# Patient Record
Sex: MALE
[De-identification: names, ages, dates, MRNs, and addresses within clinical notes are randomized per-mention and may not be internally consistent; named-entity substitution may affect disease eponyms.]

---

## 2020-04-25 ENCOUNTER — MESSAGE (OUTPATIENT)
Age: 40
End: 2020-04-25

## 2021-05-21 ENCOUNTER — EMERGENCY (EMERGENCY)
Facility: HOSPITAL | Age: 41
LOS: 1 days | Discharge: HOME | End: 2021-05-21
Attending: EMERGENCY MEDICINE
Payer: COMMERCIAL

## 2021-05-21 VITALS
SYSTOLIC BLOOD PRESSURE: 156 MMHG | HEIGHT: 71 IN | RESPIRATION RATE: 16 BRPM | HEART RATE: 96 BPM | DIASTOLIC BLOOD PRESSURE: 96 MMHG | OXYGEN SATURATION: 99 % | TEMPERATURE: 98 F | WEIGHT: 199.96 LBS

## 2021-05-21 PROCEDURE — 99283 EMERGENCY DEPT VISIT LOW MDM: CPT

## 2021-05-21 PROCEDURE — 99053 MED SERV 10PM-8AM 24 HR FAC: CPT

## 2021-05-21 NOTE — ED PROVIDER NOTE - CLINICAL SUMMARY MEDICAL DECISION MAKING FREE TEXT BOX
42 yo M PMH T1DM presents after blood exposure to the eyes at work while intubating. washed out eyes extensively. appropriate blood work sent. patient will f/u with employee health and receive post exposure prophylaxis

## 2021-05-21 NOTE — ED PROVIDER NOTE - ATTENDING CONTRIBUTION TO CARE
blood exposure to mucosa. pt had thorough rinsing in ED. no other acute issues. post exposure ppx given. pt stable to f/u with employee health.

## 2021-05-21 NOTE — ED PROVIDER NOTE - PROGRESS NOTE DETAILS
Dipti Centeno MD PGY-3 discussed risks and benefits of post exposure prophylaxis. patient decided to accept it. 7 day course provided in ER, informed to f/u with employee health in 2 days.

## 2021-05-21 NOTE — ED PROVIDER NOTE - NS ED ROS FT
REVIEW OF SYSTEMS:  General:  no fever, no chills  HEENT: +body fluid exposure in eyes per HPI  Cardiac: no chest pain  Respiratory: no cough, no shortness of breath  Gastrointestinal: no abdominal pain  -Dipti Centeno PGY-3

## 2021-05-21 NOTE — ED PROVIDER NOTE - PATIENT PORTAL LINK FT
You can access the FollowMyHealth Patient Portal offered by [facility] by registering at the following website: [email or web address]. By joining [facility]’s FollowMyHealth portal, you will also be able to view your health information using other applications (apps) compatible with our system.

## 2021-05-21 NOTE — ED ADULT NURSE NOTE - OBJECTIVE STATEMENT
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 The pt they were taken care of was brought to [facility]. Pt denies changes in vision, rash or other symptoms. Pt butterflied for labwork. 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 The pt they were taken care of was brought to [facility]. Pt denies changes in vision, rash or other symptoms. Pt butterflied for labwork and used eyewash station

## 2021-05-21 NOTE — ED PROVIDER NOTE - PHYSICAL EXAMINATION
PHYSICAL EXAM:   General: well-appearing, appears stated age, not in extremis   HEENT: NC/AT, airway patent  Cardiovascular: regular rate   Respiratory:  nonlabored respirations  Neuro: awake, alert, interactive. Moving all extremities. Ambulatory  Psychiatric: appropriate mood and affect.   -Dipti Centeno PGY-3

## 2021-05-21 NOTE — ED ADULT NURSE NOTE - NSIMPLEMENTINTERV_GEN_ALL_ED
Implemented All Universal Safety Interventions:  [place] to call system. Call bell, personal items and telephone within reach. Instruct patient to call for assistance. Room bathroom lighting operational. Non-slip footwear when patient is off stretcher. Physically safe environment: no spills, clutter or unnecessary equipment. Stretcher in lowest position, wheels locked, appropriate side rails in place.

## 2021-05-21 NOTE — ED PROVIDER NOTE - OBJECTIVE STATEMENT
40 yo M PMH T1DM presents after blood exposure to the eyes at work while intubating. 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 No other complaints at this time. Source patient was an IV drug user, but unknown hx hepatitis/HIV.

## 2021-05-21 NOTE — ED PROVIDER NOTE - NSFOLLOWUPINSTRUCTIONS_ED_ALL_ED_FT
You were exposed to body fluids     Please take the post exposure prophylaxis as prescribed.     Please follow up with employee health within 3 days     Please return to the ER for any new or worsening symptoms or medication side effects

## 2024-01-26 ENCOUNTER — NON-APPOINTMENT (OUTPATIENT)
Age: 44
End: 2024-01-26

## 2024-02-01 ENCOUNTER — EMERGENCY (EMERGENCY)
Facility: HOSPITAL | Age: 44
LOS: 1 days | Discharge: HOME | End: 2024-02-01
Attending: EMERGENCY MEDICINE | Admitting: EMERGENCY MEDICINE
Payer: OTHER MISCELLANEOUS

## 2024-02-01 VITALS
HEIGHT: 72 IN | DIASTOLIC BLOOD PRESSURE: 100 MMHG | HEART RATE: 75 BPM | TEMPERATURE: 99 F | SYSTOLIC BLOOD PRESSURE: 148 MMHG | RESPIRATION RATE: 16 BRPM | WEIGHT: 205.03 LBS | OXYGEN SATURATION: 97 %

## 2024-02-01 DIAGNOSIS — M25.511 PAIN IN RIGHT SHOULDER: ICD-10-CM
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
## 2024-02-01 DIAGNOSIS — S49.91XA UNSPECIFIED INJURY OF RIGHT SHOULDER AND UPPER ARM, INITIAL ENCOUNTER: ICD-10-CM
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
## 2024-02-01 DIAGNOSIS — Z79.4 LONG TERM (CURRENT) USE OF INSULIN: ICD-10-CM

## 2024-02-01 DIAGNOSIS — E11.9 TYPE 2 DIABETES MELLITUS WITHOUT COMPLICATIONS: ICD-10-CM

## 2024-02-01 PROCEDURE — 99284 EMERGENCY DEPT VISIT MOD MDM: CPT

## 2024-02-01 PROCEDURE — 73030 X-RAY EXAM OF SHOULDER: CPT | Mod: 26,RT

## 2024-02-01 RX ORDER — IBUPROFEN 200 MG
600 TABLET ORAL ONCE
Refills: 0 | Status: COMPLETED | OUTPATIENT
Start: 2024-02-01 | End: 2024-02-01

## 2024-02-01 RX ORDER — ACETAMINOPHEN 500 MG
650 TABLET ORAL ONCE
Refills: 0 | Status: COMPLETED | OUTPATIENT
Start: 2024-02-01 | End: 2024-02-01

## 2024-02-01 RX ADMIN — Medication 600 MILLIGRAM(S): at 17:54

## 2024-02-01 RX ADMIN — Medication 650 MILLIGRAM(S): at 17:54

## 2024-02-01 NOTE — ED PROVIDER NOTE - UPPER EXTREMITY EXAM, RIGHT
Pain with abduction of right shoulder there is no deformities distal median radial ulnar nerves are intact motor and sensory.

## 2024-02-01 NOTE — ED ADULT NURSE NOTE - OBJECTIVE STATEMENT
Patient is a 43 y/o M c/o right shoulder pain. 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 patient reports difficulty lifting shoulder. Patient reports hx of DM.

## 2024-02-01 NOTE — ED PROVIDER NOTE - NSFOLLOWUPINSTRUCTIONS_ED_ALL_ED_FT
Rotator Cuff Injury    WHAT YOU NEED TO KNOW:    A rotator cuff injury is damage to the muscles or tendons of your rotator cuff. The rotator cuff is a group of muscles and tendons that hold the shoulder joint in place. The damage may include muscle stretching, tendon tears, or bursa inflammation. The bursa is a fluid sac around the joint.  Shoulder Anatomy    DISCHARGE INSTRUCTIONS:    Call your doctor or orthopedist if:    You suddenly cannot move your arm.    The pain in your shoulder or arm is not improving, or is worse than before you started treatment.    You have new pain in your neck.    You have questions or concerns about your condition or care.  Medicines: You may need any of the following:    Acetaminophen decreases pain and fever. It is available without a doctor's order. Ask how much to take and how often to take it. Follow directions. Read the labels of all other medicines you are using to see if they also contain acetaminophen, or ask your doctor or pharmacist. Acetaminophen can cause liver damage if not taken correctly.    NSAIDs help decrease swelling and pain or fever. This medicine is available with or without a doctor's order. NSAIDs can cause stomach bleeding or kidney problems in certain people. If you take blood thinner medicine, always ask your healthcare provider if NSAIDs are safe for you. Always read the medicine label and follow directions.    Prescription pain medicine may be given. Ask your healthcare provider how to take this medicine safely. Some prescription pain medicines contain acetaminophen. Do not take other medicines that contain acetaminophen without talking to your healthcare provider. Too much acetaminophen may cause liver damage. Prescription pain medicine may cause constipation. Ask your healthcare provider how to prevent or treat constipation.    Take your medicine as directed. Contact your healthcare provider if you think your medicine is not helping or if you have side effects. Tell your provider if you are allergic to any medicine. Keep a list of the medicines, vitamins, and herbs you take. Include the amounts, and when and why you take them. Bring the list or the pill bottles to follow-up visits. Carry your medicine list with you in case of an emergency.  A physical therapist can teach you exercises to help improve shoulder movement and strength, and decrease pain. You may learn changes to daily activities that will help decrease stress on your tendons.    Self-care:    Rest your shoulder as directed. Overuse of your shoulder can make your injury worse. Avoid heavy lifting, putting your arms over your head, or sports that need an overhead or throwing motion. Any of these movements can cause or worsen a rotator cuff injury.    Put ice on your shoulder for 15 minutes every hour, or as directed.Ice helps decrease pain and swelling. Use an ice pack, or put crushed ice in a plastic bag. Wrap a towel around the bag before you put it on your shoulder.    Put heat on your shoulder when directed. After the first several days, heat may help relax the muscles in your shoulder. Use a heat pack or heating pad. Apply heat for 20 minutes every hour, or as directed.  Follow up with your doctor or orthopedist as directed: Write down your questions so you remember to ask them during your visits.

## 2024-02-01 NOTE — ED PROVIDER NOTE - CARE PROVIDER_API CALL
Antony Sneed  Orthopaedic Surgery  [address]  [place] [address]  Phone: [phone]  Fax: [phone]  Follow Up Time:

## 2024-02-01 NOTE — ED PROVIDER NOTE - OBJECTIVE STATEMENT
44-year-old male history of insulin-dependent diabetes, who presents with right-sided shoulder pain since this morning.  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  Patient has not taken anything for pain.  He attempted to see an orthopedist today was unable to see 1.  Denies prior injury to the right arm, patient is right-hand dominant.  Denies numbness paresthesias or focal weakness.  Denies neck pain.  Denies any other injury during this event

## 2024-02-01 NOTE — ED PROVIDER NOTE - CLINICAL SUMMARY MEDICAL DECISION MAKING FREE TEXT BOX
Patient presents with R shoulder_ joint pain. Given history, exam and workup patient likely has arthritis. I have low suspicion for fracture, dislocation, significant ligamentous injury, septic arthritis, gout flare, new autoimmune arthropathy, or gonococcal arthropathy.    plan: xrays right shoulder, tylenol, motrin.

## 2024-02-11 PROBLEM — E10.9 TYPE 1 DIABETES MELLITUS WITHOUT COMPLICATIONS: Chronic | Status: ACTIVE | Noted: 2021-05-21
